# Patient Record
Sex: MALE | Race: WHITE | NOT HISPANIC OR LATINO | Employment: UNEMPLOYED | URBAN - NONMETROPOLITAN AREA
[De-identification: names, ages, dates, MRNs, and addresses within clinical notes are randomized per-mention and may not be internally consistent; named-entity substitution may affect disease eponyms.]

---

## 2023-02-27 LAB
ANION GAP IN SER/PLAS: 11 MMOL/L (ref 10–20)
CALCIUM (MG/DL) IN SER/PLAS: 9.7 MG/DL (ref 8.6–10.3)
CARBON DIOXIDE, TOTAL (MMOL/L) IN SER/PLAS: 25 MMOL/L (ref 21–32)
CHLORIDE (MMOL/L) IN SER/PLAS: 107 MMOL/L (ref 98–107)
CREATININE (MG/DL) IN SER/PLAS: 0.76 MG/DL (ref 0.5–1.3)
GFR MALE: >90 ML/MIN/1.73M2
GLUCOSE (MG/DL) IN SER/PLAS: 102 MG/DL (ref 74–99)
POTASSIUM (MMOL/L) IN SER/PLAS: 4.2 MMOL/L (ref 3.5–5.3)
SODIUM (MMOL/L) IN SER/PLAS: 139 MMOL/L (ref 136–145)
UREA NITROGEN (MG/DL) IN SER/PLAS: 16 MG/DL (ref 6–23)

## 2023-03-02 PROBLEM — R19.02 ABDOMINAL WALL MASS OF LEFT UPPER QUADRANT: Status: ACTIVE | Noted: 2023-03-02

## 2023-03-02 PROBLEM — E66.01 MORBID OBESITY WITH BMI OF 40.0-44.9, ADULT (MULTI): Status: ACTIVE | Noted: 2023-03-02

## 2023-03-06 ENCOUNTER — OFFICE VISIT (OUTPATIENT)
Dept: PRIMARY CARE | Facility: CLINIC | Age: 28
End: 2023-03-06
Payer: COMMERCIAL

## 2023-03-06 VITALS
HEART RATE: 82 BPM | OXYGEN SATURATION: 97 % | WEIGHT: 303 LBS | DIASTOLIC BLOOD PRESSURE: 74 MMHG | HEIGHT: 71 IN | BODY MASS INDEX: 42.42 KG/M2 | SYSTOLIC BLOOD PRESSURE: 128 MMHG

## 2023-03-06 DIAGNOSIS — R10.13 DYSPEPSIA: Primary | ICD-10-CM

## 2023-03-06 PROCEDURE — 99213 OFFICE O/P EST LOW 20 MIN: CPT | Performed by: FAMILY MEDICINE

## 2023-03-06 PROCEDURE — 1036F TOBACCO NON-USER: CPT | Performed by: FAMILY MEDICINE

## 2023-03-06 RX ORDER — PANTOPRAZOLE SODIUM 40 MG/1
40 TABLET, DELAYED RELEASE ORAL DAILY
Qty: 30 TABLET | Refills: 11 | Status: SHIPPED | OUTPATIENT
Start: 2023-03-06 | End: 2024-04-04 | Stop reason: WASHOUT

## 2023-03-06 RX ORDER — PANTOPRAZOLE SODIUM 40 MG/1
1 TABLET, DELAYED RELEASE ORAL DAILY
Qty: 30 TABLET | Refills: 2 | COMMUNITY
Start: 2023-03-02 | End: 2023-03-06 | Stop reason: SDUPTHER

## 2023-03-06 ASSESSMENT — PROMIS GLOBAL HEALTH SCALE
RATE_SOCIAL_SATISFACTION: VERY GOOD
RATE_GENERAL_HEALTH: GOOD
CARRYOUT_SOCIAL_ACTIVITIES: VERY GOOD
RATE_AVERAGE_PAIN: 5
RATE_AVERAGE_FATIGUE: MODERATE
RATE_QUALITY_OF_LIFE: VERY GOOD
CARRYOUT_PHYSICAL_ACTIVITIES: COMPLETELY
RATE_MENTAL_HEALTH: GOOD
RATE_PHYSICAL_HEALTH: FAIR
EMOTIONAL_PROBLEMS: SOMETIMES

## 2023-03-06 NOTE — PROGRESS NOTES
"Subjective   Patient ID: Dennis Kumar is a 27 y.o. male who presents for Follow-up (Review CT scan ).    Previous note reviewed.  CAT scan was ordered but he got worse went to the emergency room and CAT scan was performed.  It was normal.  He was given Protonix.  Today he states he is feeling better.  Reviewed the concepts of gastritis ulcer disease and common postcholecystectomy syndromes.  He does not note these symptoms relate to mealtime he does not have diarrhea         Review of Systems  Denies chest pains palpitations cough shortness of breath heartburn  Objective   /74 (BP Location: Left arm, Patient Position: Sitting)   Pulse 82   Ht 1.803 m (5' 11\")   Wt (!) 137 kg (303 lb)   SpO2 97%   BMI 42.26 kg/m²     Physical Exam  Chest clear heart regular without murmur abdomen is soft nontender no solid or pulsatile masses    Assessment/Plan   Diagnoses and all orders for this visit:  Dyspepsia  Plan at this time is to take pantoprazole for 90 days.  If not better in 30 and 60 days he is to let us know.  After that if symptoms were persisting or had return he would be a candidate for EGD  Recommend 3-month follow-up       "

## 2023-06-12 ENCOUNTER — APPOINTMENT (OUTPATIENT)
Dept: PRIMARY CARE | Facility: CLINIC | Age: 28
End: 2023-06-12
Payer: COMMERCIAL

## 2023-10-16 ENCOUNTER — OFFICE VISIT (OUTPATIENT)
Dept: URGENT CARE | Facility: CLINIC | Age: 28
End: 2023-10-16

## 2023-10-16 VITALS
HEART RATE: 109 BPM | SYSTOLIC BLOOD PRESSURE: 146 MMHG | TEMPERATURE: 98.2 F | DIASTOLIC BLOOD PRESSURE: 91 MMHG | OXYGEN SATURATION: 95 % | RESPIRATION RATE: 14 BRPM

## 2023-10-16 DIAGNOSIS — H61.23 BILATERAL IMPACTED CERUMEN: Primary | ICD-10-CM

## 2023-10-16 PROBLEM — K21.9 GASTROESOPHAGEAL REFLUX DISEASE WITH ULCERATION: Status: ACTIVE | Noted: 2023-10-16

## 2023-10-16 PROCEDURE — 69210 REMOVE IMPACTED EAR WAX UNI: CPT | Performed by: PHYSICIAN ASSISTANT

## 2023-10-16 PROCEDURE — 99212 OFFICE O/P EST SF 10 MIN: CPT | Mod: 25 | Performed by: PHYSICIAN ASSISTANT

## 2023-10-16 NOTE — PATIENT INSTRUCTIONS
"What is ear wax impaction?  Ear wax impaction is when ear wax builds up enough to cause symptoms. Normally, ear wax helps to protect the insides of the ears and prevents injury or infection (figure 1). But having too much ear wax can cause symptoms like trouble hearing and sometimes pain. The medical term for ear wax is \"cerumen.\"    Young children and older adults are more likely than others to have ear wax impaction.    What causes ear wax impaction?  Several different things can cause ear wax impaction:    ?Diseases that affect the ear - Some health problems can affect the shape of the inside of the ear, and make it hard for wax to move out. For example, skin problems that cause skin cells to shed a lot can lead to wax buildup in the ears.    ?Narrow ear canal (figure 2) - In some people, the ear canals are narrower than in others. These people might be more likely to have ear wax impaction. A person's ear canal can become narrower after an ear injury or after severe or multiple ear infections.    ?Changes in ear wax and lining due to aging - As people get older, their ear wax gets harder and thicker. This makes it more difficult for the wax to move out of the ear as it normally should.    ?Ear-cleaning habits - Some people try to clean their ears using cotton swabs (Q-Tips) or other tools. This can actually push the wax deeper into the ear instead of getting it out. Over time, this can cause ear wax impaction.    ?Making too much ear wax - Some people make more ear wax than others. This can happen when water gets trapped in the ear, or when the ear is injured. But some people just have a lot of ear wax for no obvious reason.    ?Using devices that go into the ear - Hearing aids, \"ear bud\"-style headphones, and ear plugs can cause ear wax impaction if they are used over a long period of time.    What are the symptoms of ear wax impaction?  The symptoms include:    ?Trouble hearing    ?Pain in the ear    ?Feeling " "like the ear is blocked or plugged    ?Hearing a ringing noise in the ear    These symptoms can happen in 1 or both ears.    Should I see a doctor or nurse?  Yes. If you or your child have any of these symptoms, see a doctor or nurse. They can check the insides of the ears to figure out if the symptoms are caused by ear wax impaction or another problem, such as an ear infection.    Should I clean my (or my child's) ears at home?  No. The insides of the ears do not usually need to be cleaned. Sticking anything into the ears can push the wax in deeper and cause impaction.    \"Ear candling\" involves lighting 1 end of a hollow candle, and putting the other end in the ear. Ear candling is not recommended for ear wax removal. It does not remove ear wax and can even cause ear injuries and burns.    How is ear wax impaction treated?  There are several treatments to remove impacted ear wax. Doctors and nurses offer these treatments only to people who have bothersome symptoms. They do not recommend treatments for removing ear wax in people who have no symptoms, even if they have ear wax impaction.    In some cases, doctors and nurses will remove ear wax in people whose ears are impacted and who aren't able to let others know if they have symptoms or not. This can include young children, and people who are confused or have trouble communicating, including some older adults.    There are several different ways to remove ear wax:    ?Ear drops - Special ear drops can soften ear wax and help it to drain out. Ear drops are not usually safe for people with an ear infection or damage to the eardrum.    ?Rinsing - In some cases, a doctor or nurse can remove impacted ear wax by squirting water (or another liquid) into the ear to rinse it out.    ?Special tools - A doctor or nurse might use a special tool to remove ear wax. There are different types of tools that can do this safely. These include small sticks, hooks, and spoons. There " are also tools that use suction to pull the wax out.    Can ear wax impaction be prevented?  It depends. If you have repeated problems with ear wax impaction, talk to your doctor or nurse. They might be able to suggest ways to help prevent future impactions. For example, they might suggest using mineral oil to soften the ear wax, removing hearing aids overnight if you use them, or getting your ears cleaned regularly by a doctor or nurse.    What problems should I watch for?  Call for advice if:    ?You have signs of infection - These include fever of 100.4°F (38°C) or higher or chills.    ?Your ear is bleeding or draining pus.    ?You have pain, ringing in the ear, or hearing loss that is new or worse than before.    ?Your symptoms are not getting better after a few days, if you are using ear drop treatments.

## 2023-10-16 NOTE — PROGRESS NOTES
[facility] URGENT CARE   CROW NOTE:      Name: Dennis Kumar, 28 y.o.    CSN:[id number]   MRN:[id number]    PCP: Ranjan Waldron MD    ALL:  No Known Allergies    History:    Chief Complaint: Earache (X 1 day)  Encounter Date: 10/16/2023 3:00 PM    HPI: The history was obtained from the {[facility] HISTORIAN:[id number]}. Dennis is a 28 y.o. male, who presents with a chief complaint of Earache (X 1 day) ***    PMHx:  No past medical history on file.        Current Outpatient Medications   Medication Sig Dispense Refill    pantoprazole (Protonix) 40 mg EC tablet Take 1 tablet (40 mg) by mouth once daily. (Patient not taking: Reported on 10/16/2023) 30 tablet 11     No current facility-administered medications for this visit.         PMSx:  No past surgical history on file.    Fam Hx: No family history on file.    SOC. Hx:     Social History     Socioeconomic History    Marital status:      Spouse name: Not on file    Number of children: Not on file    Years of education: Not on file    Highest education level: Not on file   Occupational History    Not on file   Tobacco Use    Smoking status: Never    Smokeless tobacco: Never   Substance and Sexual Activity    Alcohol use: Not on file    Drug use: Not on file    Sexual activity: Not on file   Other Topics Concern    Not on file   Social History Narrative    Not on file     Social Determinants of Health     Financial Resource Strain: Not on file   Food Insecurity: Not on file   Transportation Needs: Not on file   Physical Activity: Not on file   Stress: Not on file   Social Connections: Not on file   Intimate Partner Violence: Not on file   Housing Stability: Not on file         Vitals:    10/16/23 1417   BP: (!) 146/91   Pulse: 109   Resp: 14   Temp: 36.8 °C (98.2 °F)   SpO2: 95%                Physical Exam      ***    LABORATORY @ RADIOLOGICAL IMAGING (if done):          COURSE/MEDICAL DECISION MAKING:    ***          Clinical Impression:  No  diagnosis found.            Xander Parr PA-C   Advanced Practice Provider  [facility] URGENT CARE

## 2023-10-16 NOTE — PROGRESS NOTES
[facility] URGENT CARE   CROW NOTE:      Name: Dennis Kumar, 28 y.o.    CSN:[id number]   MRN:[id number]    PCP: Ranjan Waldron MD    ALL:  No Known Allergies    History:    Chief Complaint: Earache (X 1 day)  Encounter Date: 10/16/2023 4:45 PM    HPI: The history was obtained from the patient. Dennis is a 28 y.o. male, hx of tympanostomy tube placement, who presents with a chief complaint of Earache (X 1 day). Reports bilateral hearing loss and R ear pain since this morning, which prompted him to seek care. Had tympanostomy tubes placed as a child d/t recurrent ear infections. Had a previous job where he wore ear protection for extended periods of time. Denies tinnitus, vertigo/dizziness, headaches, or N/V.     PMHx:  No past medical history on file.        Current Outpatient Medications   Medication Sig Dispense Refill    pantoprazole (Protonix) 40 mg EC tablet Take 1 tablet (40 mg) by mouth once daily. (Patient not taking: Reported on 10/16/2023) 30 tablet 11     No current facility-administered medications for this visit.         PMSx:  No past surgical history on file.    Fam Hx: No family history on file.    SOC. Hx:     Social History     Socioeconomic History    Marital status:      Spouse name: Not on file    Number of children: Not on file    Years of education: Not on file    Highest education level: Not on file   Occupational History    Not on file   Tobacco Use    Smoking status: Never    Smokeless tobacco: Never   Substance and Sexual Activity    Alcohol use: Not on file    Drug use: Not on file    Sexual activity: Not on file   Other Topics Concern    Not on file   Social History Narrative    Not on file     Social Determinants of Health     Financial Resource Strain: Not on file   Food Insecurity: Not on file   Transportation Needs: Not on file   Physical Activity: Not on file   Stress: Not on file   Social Connections: Not on file   Intimate Partner Violence: Not on  file   Housing Stability: Not on file         Vitals:    10/16/23 1417   BP: (!) 146/91   Pulse: 109   Resp: 14   Temp: 36.8 °C (98.2 °F)   SpO2: 95%            [unfilled]    Physical Exam  Constitutional:       Appearance: Normal appearance.   HENT:      Head: Normocephalic and atraumatic.      Right Ear: External ear normal. Decreased hearing noted. There is impacted cerumen. No mastoid tenderness. Tympanic membrane is scarred.      Left Ear: External ear normal. Decreased hearing noted. There is impacted cerumen. No mastoid tenderness. Tympanic membrane is not scarred.      Ears:      Comments: Prior to procedure, complete impaction of R ear and partial impaction of L ear (TM visible in 11'o clock position.) Following procedure, tympanic membranes visible bilaterally. R ear showed two linear erythematous abrasions (5-6 o clock position), and tympanosclerosis.   Eyes:      Extraocular Movements: Extraocular movements intact.      Conjunctiva/sclera: Conjunctivae normal.      Pupils: Pupils are equal, round, and reactive to light.   Skin:     General: Skin is warm and dry.   Neurological:      General: No focal deficit present.      Mental Status: He is alert.   Psychiatric:         Mood and Affect: Mood normal.     Patient ID: Dennis Kumar is a 28 y.o. male.    Ear Cerumen Removal    Performed by: Julia Lopez  Authorized by: Xander Parr PA-C      Procedure: cerumen removal     Patient had a significant amount of cerumen in his bilateral ear canal(s). R ear was completely impacted, while L ear was partially impacted. Using  irrigation and manual extraction , GIOVANI Ontiveros carefully removed as much cerumen as she could. Following the procedure, there were two linear erythematous abrasions in the 5-6 o clock position, and noted tympanosclerosis. There was no TM perforation, and he tolerated the procedure without difficulty, with complete resolution of symptoms.    LABORATORY @ RADIOLOGICAL IMAGING (if done):      No imaging indicated at this time.      COURSE/MEDICAL DECISION MAKIN y/o M, hx of tympanostomy tube placement, presenting with R ear pain x 1 day with bilateral hearing loss. Cerumen was removed without incident (see procedure note), with complete resolution of pain & hearing loss. Pt made aware that tympanosclerosis is likely secondary to prior surgery. Also made aware of R ear erythema/inflammation, and to follow up if pain worsens/persists. Pt is ok with this plan and has no further concerns.     Clinical Impression:  1. Bilateral impacted cerumen        Xander Parr PA-C   Advanced Practice Provider  [facility] URGENT CARE

## 2024-04-03 ENCOUNTER — APPOINTMENT (OUTPATIENT)
Dept: PRIMARY CARE | Facility: CLINIC | Age: 29
End: 2024-04-03

## 2024-04-04 ENCOUNTER — OFFICE VISIT (OUTPATIENT)
Dept: PRIMARY CARE | Facility: CLINIC | Age: 29
End: 2024-04-04

## 2024-04-04 VITALS
WEIGHT: 315 LBS | SYSTOLIC BLOOD PRESSURE: 130 MMHG | BODY MASS INDEX: 44.1 KG/M2 | DIASTOLIC BLOOD PRESSURE: 86 MMHG | HEIGHT: 71 IN | OXYGEN SATURATION: 97 % | HEART RATE: 89 BPM

## 2024-04-04 DIAGNOSIS — R07.89 ATYPICAL CHEST PAIN: Primary | ICD-10-CM

## 2024-04-04 PROCEDURE — 99213 OFFICE O/P EST LOW 20 MIN: CPT | Performed by: FAMILY MEDICINE

## 2024-04-04 PROCEDURE — 1036F TOBACCO NON-USER: CPT | Performed by: FAMILY MEDICINE

## 2024-04-04 NOTE — PROGRESS NOTES
"Subjective   Patient ID: Dennis Kumar is a 28 y.o. male who presents for Follow-up (ER 3/30/24).    HPI ER reviewed for more than a week he has had some substernal burning sharp chest pain lasting less than 5 minutes but recurring episodically.  He had similar symptoms when he had the flu more recently.  He was advised evaluated in [facility] emergency room as a rule out MI had chest x-ray  Reviewed workup may include a stress test and/or echocardiogram.  Not cardiac could relate to asthma.  He is not known to have had allergic symptoms or asthma as a child.  Possibility of GERD was reviewed and risk factors of caffeine and weight noted.  The skeletal would be the third option of noncardiac causes  Review of Systems  General-no fatigue   ENT no problems with vision swallowing  Cardiac no chest pains palpitations change in exercise tolerance or capacity  Pulmonary no cough shortness of breath  GI no heartburn or abdominal pain  Musculoskeletal no joint pains  Objective   /86   Pulse 89   Ht 1.803 m (5' 11\")   Wt (!) 154 kg (340 lb 3.2 oz)   SpO2 97%   BMI 47.45 kg/m²     Physical Exam  General:  Alert, No acute distress. Appears stated age  Eye:  Pupils are equal, round and reactive to light, Extraocular movements are intact, Normal conjunctiva.    Neck:  Supple, Non-tender, No carotid bruit, No jugular venous distention, No lymphadenopathy, No thyromegaly.    Respiratory:  Lungs are clear to auscultation, Respirations are non-labored, Breath sounds are equal.    Cardiovascular:  Normal rate, Regular rhythm, No murmur.    Gastrointestinal:  Soft, Non-tender, No organomegaly. No solid or pulsatile mass  Integumentary:  Warm, Dry. No concerning lesions on exposed areas  Neurologic:  Alert, Oriented.  Gross and fine motor intact, CN 2-12 intact  Psychiatric:  Cooperative, Appropriate mood & affect.  Jasiel non tender on costochondral jcts  Assessment/Plan   Problem List Items Addressed This Visit  "   None  Visit Diagnoses         Codes    Atypical chest pain    -  Primary R07.89        At this point he is willing to take 1 month of a PPI and see if symptoms montserrat.  He is to report in 1 month and if he is willing able to pursue the above-captioned workup

## 2024-04-09 ENCOUNTER — HOSPITAL ENCOUNTER (OUTPATIENT)
Dept: CARDIOLOGY | Facility: HOSPITAL | Age: 29
Discharge: HOME | End: 2024-04-09

## 2024-04-09 ENCOUNTER — APPOINTMENT (OUTPATIENT)
Dept: RADIOLOGY | Facility: HOSPITAL | Age: 29
End: 2024-04-09

## 2024-04-09 ENCOUNTER — HOSPITAL ENCOUNTER (EMERGENCY)
Facility: HOSPITAL | Age: 29
Discharge: HOME | End: 2024-04-09

## 2024-04-09 VITALS
HEART RATE: 83 BPM | RESPIRATION RATE: 18 BRPM | TEMPERATURE: 98.6 F | OXYGEN SATURATION: 97 % | BODY MASS INDEX: 44.1 KG/M2 | SYSTOLIC BLOOD PRESSURE: 130 MMHG | WEIGHT: 315 LBS | DIASTOLIC BLOOD PRESSURE: 82 MMHG | HEIGHT: 71 IN

## 2024-04-09 DIAGNOSIS — R10.12 LEFT UPPER QUADRANT ABDOMINAL PAIN: Primary | ICD-10-CM

## 2024-04-09 LAB
ALBUMIN SERPL BCP-MCNC: 4.6 G/DL (ref 3.4–5)
ALP SERPL-CCNC: 102 U/L (ref 33–120)
ALT SERPL W P-5'-P-CCNC: 35 U/L (ref 10–52)
ANION GAP SERPL CALC-SCNC: 13 MMOL/L (ref 10–20)
APPEARANCE UR: ABNORMAL
AST SERPL W P-5'-P-CCNC: 18 U/L (ref 9–39)
BASOPHILS # BLD AUTO: 0.04 X10*3/UL (ref 0–0.1)
BASOPHILS NFR BLD AUTO: 0.4 %
BILIRUB SERPL-MCNC: 0.7 MG/DL (ref 0–1.2)
BILIRUB UR STRIP.AUTO-MCNC: NEGATIVE MG/DL
BUN SERPL-MCNC: 14 MG/DL (ref 6–23)
CALCIUM SERPL-MCNC: 9.2 MG/DL (ref 8.6–10.3)
CHLORIDE SERPL-SCNC: 104 MMOL/L (ref 98–107)
CO2 SERPL-SCNC: 25 MMOL/L (ref 21–32)
COLOR UR: YELLOW
CREAT SERPL-MCNC: 0.81 MG/DL (ref 0.5–1.3)
D DIMER PPP FEU-MCNC: 357 NG/ML FEU
EGFRCR SERPLBLD CKD-EPI 2021: >90 ML/MIN/1.73M*2
EOSINOPHIL # BLD AUTO: 0.05 X10*3/UL (ref 0–0.7)
EOSINOPHIL NFR BLD AUTO: 0.4 %
ERYTHROCYTE [DISTWIDTH] IN BLOOD BY AUTOMATED COUNT: 12.9 % (ref 11.5–14.5)
GLUCOSE SERPL-MCNC: 84 MG/DL (ref 74–99)
GLUCOSE UR STRIP.AUTO-MCNC: NEGATIVE MG/DL
HCT VFR BLD AUTO: 46.4 % (ref 41–52)
HGB BLD-MCNC: 15.1 G/DL (ref 13.5–17.5)
IMM GRANULOCYTES # BLD AUTO: 0.03 X10*3/UL (ref 0–0.7)
IMM GRANULOCYTES NFR BLD AUTO: 0.3 % (ref 0–0.9)
KETONES UR STRIP.AUTO-MCNC: NEGATIVE MG/DL
LEUKOCYTE ESTERASE UR QL STRIP.AUTO: NEGATIVE
LIPASE SERPL-CCNC: 39 U/L (ref 9–82)
LYMPHOCYTES # BLD AUTO: 2.28 X10*3/UL (ref 1.2–4.8)
LYMPHOCYTES NFR BLD AUTO: 20.2 %
MCH RBC QN AUTO: 26.1 PG (ref 26–34)
MCHC RBC AUTO-ENTMCNC: 32.5 G/DL (ref 32–36)
MCV RBC AUTO: 80 FL (ref 80–100)
MONOCYTES # BLD AUTO: 0.81 X10*3/UL (ref 0.1–1)
MONOCYTES NFR BLD AUTO: 7.2 %
MUCOUS THREADS #/AREA URNS AUTO: NORMAL /LPF
NEUTROPHILS # BLD AUTO: 8.08 X10*3/UL (ref 1.2–7.7)
NEUTROPHILS NFR BLD AUTO: 71.5 %
NITRITE UR QL STRIP.AUTO: NEGATIVE
NRBC BLD-RTO: 0 /100 WBCS (ref 0–0)
PH UR STRIP.AUTO: 6 [PH]
PLATELET # BLD AUTO: 219 X10*3/UL (ref 150–450)
POTASSIUM SERPL-SCNC: 3.6 MMOL/L (ref 3.5–5.3)
PROT SERPL-MCNC: 7.3 G/DL (ref 6.4–8.2)
PROT UR STRIP.AUTO-MCNC: ABNORMAL MG/DL
RBC # BLD AUTO: 5.79 X10*6/UL (ref 4.5–5.9)
RBC # UR STRIP.AUTO: NEGATIVE /UL
RBC #/AREA URNS AUTO: NORMAL /HPF
SODIUM SERPL-SCNC: 138 MMOL/L (ref 136–145)
SP GR UR STRIP.AUTO: 1.03
SQUAMOUS #/AREA URNS AUTO: NORMAL /HPF
UROBILINOGEN UR STRIP.AUTO-MCNC: 2 MG/DL
WBC # BLD AUTO: 11.3 X10*3/UL (ref 4.4–11.3)
WBC #/AREA URNS AUTO: NORMAL /HPF

## 2024-04-09 PROCEDURE — 83690 ASSAY OF LIPASE: CPT | Performed by: PHYSICIAN ASSISTANT

## 2024-04-09 PROCEDURE — 36415 COLL VENOUS BLD VENIPUNCTURE: CPT | Performed by: PHYSICIAN ASSISTANT

## 2024-04-09 PROCEDURE — 2550000001 HC RX 255 CONTRASTS: Performed by: PHYSICIAN ASSISTANT

## 2024-04-09 PROCEDURE — 85379 FIBRIN DEGRADATION QUANT: CPT | Performed by: PHYSICIAN ASSISTANT

## 2024-04-09 PROCEDURE — 74177 CT ABD & PELVIS W/CONTRAST: CPT

## 2024-04-09 PROCEDURE — 81001 URINALYSIS AUTO W/SCOPE: CPT | Performed by: PHYSICIAN ASSISTANT

## 2024-04-09 PROCEDURE — 93005 ELECTROCARDIOGRAM TRACING: CPT

## 2024-04-09 PROCEDURE — 99285 EMERGENCY DEPT VISIT HI MDM: CPT | Mod: 25

## 2024-04-09 PROCEDURE — 2500000004 HC RX 250 GENERAL PHARMACY W/ HCPCS (ALT 636 FOR OP/ED): Performed by: PHYSICIAN ASSISTANT

## 2024-04-09 PROCEDURE — 74177 CT ABD & PELVIS W/CONTRAST: CPT | Mod: FOREIGN READ | Performed by: RADIOLOGY

## 2024-04-09 PROCEDURE — 85025 COMPLETE CBC W/AUTO DIFF WBC: CPT | Performed by: PHYSICIAN ASSISTANT

## 2024-04-09 PROCEDURE — 80053 COMPREHEN METABOLIC PANEL: CPT | Performed by: PHYSICIAN ASSISTANT

## 2024-04-09 RX ORDER — DICYCLOMINE HYDROCHLORIDE 20 MG/1
20 TABLET ORAL 2 TIMES DAILY
Qty: 10 TABLET | Refills: 0 | Status: SHIPPED | OUTPATIENT
Start: 2024-04-09 | End: 2024-04-14

## 2024-04-09 RX ADMIN — IOHEXOL 68 ML: 350 INJECTION, SOLUTION INTRAVENOUS at 19:23

## 2024-04-09 RX ADMIN — SODIUM CHLORIDE 1000 ML: 9 INJECTION, SOLUTION INTRAVENOUS at 18:27

## 2024-04-09 ASSESSMENT — COLUMBIA-SUICIDE SEVERITY RATING SCALE - C-SSRS
6. HAVE YOU EVER DONE ANYTHING, STARTED TO DO ANYTHING, OR PREPARED TO DO ANYTHING TO END YOUR LIFE?: NO
1. IN THE PAST MONTH, HAVE YOU WISHED YOU WERE DEAD OR WISHED YOU COULD GO TO SLEEP AND NOT WAKE UP?: NO
2. HAVE YOU ACTUALLY HAD ANY THOUGHTS OF KILLING YOURSELF?: NO

## 2024-04-09 ASSESSMENT — ENCOUNTER SYMPTOMS
CHILLS: 0
NAUSEA: 1
HEMATURIA: 0
WOUND: 0
VOMITING: 0
EYE PAIN: 0
SEIZURES: 0
WHEEZING: 0
FEVER: 0
COUGH: 0
DIARRHEA: 1
SHORTNESS OF BREATH: 0
BACK PAIN: 0
SORE THROAT: 0
ARTHRALGIAS: 0
ABDOMINAL PAIN: 1
DYSURIA: 0
COLOR CHANGE: 0
PALPITATIONS: 0

## 2024-04-09 ASSESSMENT — PAIN SCALES - GENERAL: PAINLEVEL_OUTOF10: 6

## 2024-04-09 ASSESSMENT — PAIN - FUNCTIONAL ASSESSMENT: PAIN_FUNCTIONAL_ASSESSMENT: 0-10

## 2024-04-09 ASSESSMENT — PAIN DESCRIPTION - LOCATION: LOCATION: ABDOMEN

## 2024-04-09 NOTE — ED PROVIDER NOTES
Patient is a 28-year-old male who presents to the emergency room with a chief complaint of left upper quadrant abdominal pain that radiates into his chest and mid back.  He states that symptom onset was approximately 2 weeks ago.  He reports nausea with no vomiting.  No diarrhea.  He states that at times the pain is worse when he takes a deep breath.  He denies any shortness of breath.  He denies any injury.  He states that he was evaluated recently at [facility] and states that his workup was negative.  He states that he actually has had this pain intermittently over the past 2 years.  He states that the cause has never been determined. He states that the pain today is worse than it has been.           Review of Systems   Constitutional:  Negative for chills and fever.   HENT:  Negative for ear pain and sore throat.    Eyes:  Negative for pain and visual disturbance.   Respiratory:  Negative for cough, shortness of breath and wheezing.    Cardiovascular:  Negative for chest pain and palpitations.   Gastrointestinal:  Positive for abdominal pain, diarrhea and nausea. Negative for vomiting.   Genitourinary:  Negative for dysuria and hematuria.   Musculoskeletal:  Negative for arthralgias and back pain.   Skin:  Negative for color change, rash and wound.   Neurological:  Negative for seizures and syncope.   All other systems reviewed and are negative.       Physical Exam  Vitals and nursing note reviewed.   Constitutional:       General: He is not in acute distress.     Appearance: He is well-developed.   HENT:      Head: Normocephalic and atraumatic.   Eyes:      Extraocular Movements: Extraocular movements intact.      Conjunctiva/sclera: Conjunctivae normal.      Pupils: Pupils are equal, round, and reactive to light.   Cardiovascular:      Rate and Rhythm: Normal rate and regular rhythm.      Heart sounds: No murmur heard.  Pulmonary:      Effort: Pulmonary effort is normal. No respiratory distress.      Breath  sounds: Normal breath sounds.   Abdominal:      Palpations: Abdomen is soft. There is no shifting dullness, fluid wave, hepatomegaly or splenomegaly.      Tenderness: There is abdominal tenderness in the left upper quadrant.   Musculoskeletal:         General: No swelling.      Cervical back: Neck supple.   Skin:     General: Skin is warm and dry.      Capillary Refill: Capillary refill takes less than 2 seconds.   Neurological:      General: No focal deficit present.      Mental Status: He is alert.   Psychiatric:         Mood and Affect: Mood normal.          Labs Reviewed   CBC WITH AUTO DIFFERENTIAL - Abnormal       Result Value    WBC 11.3      nRBC 0.0      RBC 5.79      Hemoglobin 15.1      Hematocrit 46.4      MCV 80      MCH 26.1      MCHC 32.5      RDW 12.9      Platelets 219      Neutrophils % 71.5      Immature Granulocytes %, Automated 0.3      Lymphocytes % 20.2      Monocytes % 7.2      Eosinophils % 0.4      Basophils % 0.4      Neutrophils Absolute 8.08 (*)     Immature Granulocytes Absolute, Automated 0.03      Lymphocytes Absolute 2.28      Monocytes Absolute 0.81      Eosinophils Absolute 0.05      Basophils Absolute 0.04     URINALYSIS WITH REFLEX CULTURE AND MICROSCOPIC - Abnormal    Color, Urine Yellow      Appearance, Urine Hazy (*)     Specific Gravity, Urine 1.029      pH, Urine 6.0      Protein, Urine 30 (1+) (*)     Glucose, Urine NEGATIVE      Blood, Urine NEGATIVE      Ketones, Urine NEGATIVE      Bilirubin, Urine NEGATIVE      Urobilinogen, Urine 2.0 (*)     Nitrite, Urine NEGATIVE      Leukocyte Esterase, Urine NEGATIVE     COMPREHENSIVE METABOLIC PANEL   LIPASE   URINALYSIS WITH REFLEX CULTURE AND MICROSCOPIC    Narrative:     The following orders were created for panel order Urinalysis with Reflex Culture and Microscopic.  Procedure                               Abnormality         Status                     ---------                               -----------         ------                      Urinalysis with Reflex C...[[id number]]  Abnormal            Final result               Extra Urine Gray Tube[[id number]]                            In process                   Please view results for these tests on the individual orders.   EXTRA URINE GRAY TUBE   D-DIMER, VTE EXCLUSION   URINALYSIS MICROSCOPIC WITH REFLEX CULTURE    WBC, Urine 1-5      RBC, Urine 1-2      Squamous Epithelial Cells, Urine 1-9 (SPARSE)      Mucus, Urine 1+          No orders to display        ECG 12 lead    Performed by: Jenny Stovall PA-C  Authorized by: Jenny Stovall PA-C    ECG interpreted by ED Physician in the absence of a cardiologist: yes    Comments:      EKG shows normal sinus rhythm with a rate of 87 bpm.  No ST elevation.  MI interval is 114 ms and QRS duration is 90 ms.  EKG interpretation per myself, Jenny Stovall       Medical Decision Making  Patient is a 28-year-old male who presents to the emergency room department with a chief complaint of left upper quadrant pain over the past 2 weeks, he states that at times it radiates to his back and into his chest.  He states that occasionally when he takes a deep breath he does have some pain.  He was seen a week and a half ago at an outside emergency room in which she had a chest x-ray and blood work performed which were unremarkable.  Today patient is denying any chest pain.  He reports that his pain is in his left upper quadrant.  EKG unremarkable.  D-dimer is within normal limits.  CT scan of the abdomen pelvis performed which shows no acute abnormality.  Patient reports that he has had this pain intermittently for the past 2 years, recommended PCP follow-up and likely referral to gastroenterology.  Patient prescribed Bentyl.  Instructed to return for any new or worsening symptoms.  Differential diagnosis includes but not limited to pancreatitis, cholecystitis, choledocholithiasis, colitis, pulmonary embolism, acute coronary syndrome    Amount  and/or Complexity of Data Reviewed  Labs: ordered. Decision-making details documented in ED Course.  Radiology: ordered. Decision-making details documented in ED Course.    Risk  Prescription drug management.         Diagnoses as of 04/09/24 2147   Left upper quadrant abdominal pain                    Jenny Stovall PA-C  04/09/24 2141

## 2024-04-10 LAB — HOLD SPECIMEN: NORMAL

## 2024-04-11 LAB
ATRIAL RATE: 87 BPM
P AXIS: 37 DEGREES
P OFFSET: 196 MS
P ONSET: 154 MS
PR INTERVAL: 114 MS
Q ONSET: 211 MS
QRS COUNT: 14 BEATS
QRS DURATION: 90 MS
QT INTERVAL: 348 MS
QTC CALCULATION(BAZETT): 418 MS
QTC FREDERICIA: 393 MS
R AXIS: 34 DEGREES
T AXIS: 29 DEGREES
T OFFSET: 385 MS
VENTRICULAR RATE: 87 BPM

## 2024-05-02 ENCOUNTER — HOSPITAL ENCOUNTER (EMERGENCY)
Facility: HOSPITAL | Age: 29
Discharge: HOME | End: 2024-05-02
Attending: EMERGENCY MEDICINE

## 2024-05-02 ENCOUNTER — APPOINTMENT (OUTPATIENT)
Dept: PRIMARY CARE | Facility: CLINIC | Age: 29
End: 2024-05-02

## 2024-05-02 ENCOUNTER — APPOINTMENT (OUTPATIENT)
Dept: RADIOLOGY | Facility: HOSPITAL | Age: 29
End: 2024-05-02

## 2024-05-02 VITALS
HEART RATE: 93 BPM | TEMPERATURE: 98.3 F | SYSTOLIC BLOOD PRESSURE: 120 MMHG | OXYGEN SATURATION: 95 % | WEIGHT: 310 LBS | DIASTOLIC BLOOD PRESSURE: 66 MMHG | BODY MASS INDEX: 44.38 KG/M2 | HEIGHT: 70 IN | RESPIRATION RATE: 16 BRPM

## 2024-05-02 DIAGNOSIS — R10.13 EPIGASTRIC PAIN: Primary | ICD-10-CM

## 2024-05-02 DIAGNOSIS — M54.6 ACUTE BILATERAL THORACIC BACK PAIN: ICD-10-CM

## 2024-05-02 LAB
ALBUMIN SERPL BCP-MCNC: 4.3 G/DL (ref 3.4–5)
ALP SERPL-CCNC: 91 U/L (ref 33–120)
ALT SERPL W P-5'-P-CCNC: 32 U/L (ref 10–52)
ANION GAP SERPL CALC-SCNC: 12 MMOL/L (ref 10–20)
APPEARANCE UR: CLEAR
AST SERPL W P-5'-P-CCNC: 18 U/L (ref 9–39)
BASOPHILS # BLD AUTO: 0.03 X10*3/UL (ref 0–0.1)
BASOPHILS NFR BLD AUTO: 0.3 %
BILIRUB SERPL-MCNC: 0.6 MG/DL (ref 0–1.2)
BILIRUB UR STRIP.AUTO-MCNC: NEGATIVE MG/DL
BUN SERPL-MCNC: 11 MG/DL (ref 6–23)
CALCIUM SERPL-MCNC: 9.4 MG/DL (ref 8.6–10.3)
CHLORIDE SERPL-SCNC: 104 MMOL/L (ref 98–107)
CO2 SERPL-SCNC: 23 MMOL/L (ref 21–32)
COLOR UR: YELLOW
CREAT SERPL-MCNC: 0.7 MG/DL (ref 0.5–1.3)
EGFRCR SERPLBLD CKD-EPI 2021: >90 ML/MIN/1.73M*2
EOSINOPHIL # BLD AUTO: 0.07 X10*3/UL (ref 0–0.7)
EOSINOPHIL NFR BLD AUTO: 0.7 %
ERYTHROCYTE [DISTWIDTH] IN BLOOD BY AUTOMATED COUNT: 13.3 % (ref 11.5–14.5)
GLUCOSE SERPL-MCNC: 144 MG/DL (ref 74–99)
GLUCOSE UR STRIP.AUTO-MCNC: NEGATIVE MG/DL
HCT VFR BLD AUTO: 45.6 % (ref 41–52)
HGB BLD-MCNC: 15 G/DL (ref 13.5–17.5)
IMM GRANULOCYTES # BLD AUTO: 0.02 X10*3/UL (ref 0–0.7)
IMM GRANULOCYTES NFR BLD AUTO: 0.2 % (ref 0–0.9)
KETONES UR STRIP.AUTO-MCNC: NEGATIVE MG/DL
LACTATE SERPL-SCNC: 1.3 MMOL/L (ref 0.4–2)
LEUKOCYTE ESTERASE UR QL STRIP.AUTO: NEGATIVE
LIPASE SERPL-CCNC: 67 U/L (ref 9–82)
LYMPHOCYTES # BLD AUTO: 1.69 X10*3/UL (ref 1.2–4.8)
LYMPHOCYTES NFR BLD AUTO: 16.8 %
MCH RBC QN AUTO: 26.7 PG (ref 26–34)
MCHC RBC AUTO-ENTMCNC: 32.9 G/DL (ref 32–36)
MCV RBC AUTO: 81 FL (ref 80–100)
MONOCYTES # BLD AUTO: 0.73 X10*3/UL (ref 0.1–1)
MONOCYTES NFR BLD AUTO: 7.3 %
NEUTROPHILS # BLD AUTO: 7.52 X10*3/UL (ref 1.2–7.7)
NEUTROPHILS NFR BLD AUTO: 74.7 %
NITRITE UR QL STRIP.AUTO: NEGATIVE
NRBC BLD-RTO: 0 /100 WBCS (ref 0–0)
PH UR STRIP.AUTO: 7 [PH]
PLATELET # BLD AUTO: 194 X10*3/UL (ref 150–450)
POTASSIUM SERPL-SCNC: 3.9 MMOL/L (ref 3.5–5.3)
PROT SERPL-MCNC: 7 G/DL (ref 6.4–8.2)
PROT UR STRIP.AUTO-MCNC: NEGATIVE MG/DL
RBC # BLD AUTO: 5.61 X10*6/UL (ref 4.5–5.9)
RBC # UR STRIP.AUTO: NEGATIVE /UL
SODIUM SERPL-SCNC: 135 MMOL/L (ref 136–145)
SP GR UR STRIP.AUTO: 1.01
UROBILINOGEN UR STRIP.AUTO-MCNC: <2 MG/DL
WBC # BLD AUTO: 10.1 X10*3/UL (ref 4.4–11.3)

## 2024-05-02 PROCEDURE — 85025 COMPLETE CBC W/AUTO DIFF WBC: CPT | Performed by: EMERGENCY MEDICINE

## 2024-05-02 PROCEDURE — 83605 ASSAY OF LACTIC ACID: CPT | Performed by: EMERGENCY MEDICINE

## 2024-05-02 PROCEDURE — 74177 CT ABD & PELVIS W/CONTRAST: CPT | Performed by: RADIOLOGY

## 2024-05-02 PROCEDURE — 72128 CT CHEST SPINE W/O DYE: CPT | Performed by: RADIOLOGY

## 2024-05-02 PROCEDURE — 99285 EMERGENCY DEPT VISIT HI MDM: CPT | Mod: 25

## 2024-05-02 PROCEDURE — 80053 COMPREHEN METABOLIC PANEL: CPT | Performed by: EMERGENCY MEDICINE

## 2024-05-02 PROCEDURE — 72128 CT CHEST SPINE W/O DYE: CPT

## 2024-05-02 PROCEDURE — 2500000004 HC RX 250 GENERAL PHARMACY W/ HCPCS (ALT 636 FOR OP/ED): Performed by: EMERGENCY MEDICINE

## 2024-05-02 PROCEDURE — 81003 URINALYSIS AUTO W/O SCOPE: CPT | Performed by: EMERGENCY MEDICINE

## 2024-05-02 PROCEDURE — 96360 HYDRATION IV INFUSION INIT: CPT

## 2024-05-02 PROCEDURE — 36415 COLL VENOUS BLD VENIPUNCTURE: CPT | Performed by: EMERGENCY MEDICINE

## 2024-05-02 PROCEDURE — 2550000001 HC RX 255 CONTRASTS: Performed by: EMERGENCY MEDICINE

## 2024-05-02 PROCEDURE — 74177 CT ABD & PELVIS W/CONTRAST: CPT

## 2024-05-02 PROCEDURE — 96361 HYDRATE IV INFUSION ADD-ON: CPT

## 2024-05-02 PROCEDURE — 83690 ASSAY OF LIPASE: CPT | Performed by: EMERGENCY MEDICINE

## 2024-05-02 RX ORDER — SODIUM CHLORIDE 9 MG/ML
150 INJECTION, SOLUTION INTRAVENOUS CONTINUOUS
Status: DISCONTINUED | OUTPATIENT
Start: 2024-05-02 | End: 2024-05-02 | Stop reason: HOSPADM

## 2024-05-02 RX ORDER — ONDANSETRON HYDROCHLORIDE 2 MG/ML
4 INJECTION, SOLUTION INTRAVENOUS ONCE
Status: DISCONTINUED | OUTPATIENT
Start: 2024-05-02 | End: 2024-05-02 | Stop reason: HOSPADM

## 2024-05-02 RX ORDER — ONDANSETRON HYDROCHLORIDE 4 MG/2ML
4 INJECTION, SOLUTION INTRAMUSCULAR; INTRAVENOUS ONCE
Status: DISCONTINUED | OUTPATIENT
Start: 2024-05-02 | End: 2024-05-02

## 2024-05-02 RX ORDER — MORPHINE SULFATE 4 MG/ML
4 INJECTION, SOLUTION INTRAMUSCULAR; INTRAVENOUS ONCE
Status: DISCONTINUED | OUTPATIENT
Start: 2024-05-02 | End: 2024-05-02 | Stop reason: HOSPADM

## 2024-05-02 RX ADMIN — SODIUM CHLORIDE 150 ML/HR: 9 INJECTION, SOLUTION INTRAVENOUS at 15:22

## 2024-05-02 RX ADMIN — IOHEXOL 68 ML: 350 INJECTION, SOLUTION INTRAVENOUS at 15:50

## 2024-05-02 ASSESSMENT — PAIN DESCRIPTION - ONSET: ONSET: PROGRESSIVE

## 2024-05-02 ASSESSMENT — COLUMBIA-SUICIDE SEVERITY RATING SCALE - C-SSRS
1. IN THE PAST MONTH, HAVE YOU WISHED YOU WERE DEAD OR WISHED YOU COULD GO TO SLEEP AND NOT WAKE UP?: NO
2. HAVE YOU ACTUALLY HAD ANY THOUGHTS OF KILLING YOURSELF?: NO
6. HAVE YOU EVER DONE ANYTHING, STARTED TO DO ANYTHING, OR PREPARED TO DO ANYTHING TO END YOUR LIFE?: NO

## 2024-05-02 ASSESSMENT — PAIN DESCRIPTION - FREQUENCY: FREQUENCY: CONSTANT/CONTINUOUS

## 2024-05-02 ASSESSMENT — PAIN DESCRIPTION - PAIN TYPE: TYPE: ACUTE PAIN

## 2024-05-02 ASSESSMENT — PAIN SCALES - GENERAL
PAINLEVEL_OUTOF10: 7
PAINLEVEL_OUTOF10: 6

## 2024-05-02 ASSESSMENT — PAIN - FUNCTIONAL ASSESSMENT: PAIN_FUNCTIONAL_ASSESSMENT: 0-10

## 2024-05-02 ASSESSMENT — PAIN DESCRIPTION - DESCRIPTORS
DESCRIPTORS: THROBBING
DESCRIPTORS_2: STABBING
DESCRIPTORS: STABBING

## 2024-05-02 ASSESSMENT — PAIN DESCRIPTION - ORIENTATION
ORIENTATION: MID;UPPER
ORIENTATION_2: UPPER

## 2024-05-02 ASSESSMENT — PAIN DESCRIPTION - LOCATION
LOCATION_2: ABDOMEN
LOCATION: BACK

## 2024-05-02 NOTE — ED PROVIDER NOTES
Midthoracic back pain, upper abdominal pain.  This 29-year-old white male presents to the ED with complaint of upper back symptoms that began 2 weeks ago and denies any known injury.  He states that after that he then developed diffuse bilateral upper quadrant abdominal pain.  He states that he has had nausea without vomiting and also has had some diarrhea today developed headache symptoms.  He describes his abdominal pain symptoms as sharp and stabbing he also admits to some urinary frequency though denies any dysuria or hematuria previous history of kidney stones.  Patient has had his gallbladder removed previously.  Patient states that nothing makes his symptoms better but movement makes his symptoms worse.            History provided by:  Patient   used: No         Physical Exam  Vitals and nursing note reviewed.   Constitutional:       General: He is awake.      Appearance: Normal appearance. He is morbidly obese.   HENT:      Head: Normocephalic and atraumatic.      Right Ear: Hearing and external ear normal.      Left Ear: Hearing and external ear normal.      Nose: Nose normal. No congestion or rhinorrhea.      Mouth/Throat:      Lips: Pink.      Mouth: Mucous membranes are moist.      Pharynx: Oropharynx is clear. Uvula midline. No oropharyngeal exudate or posterior oropharyngeal erythema.   Eyes:      General: Lids are normal. Vision grossly intact.         Right eye: No discharge.         Left eye: No discharge.      Extraocular Movements: Extraocular movements intact.      Conjunctiva/sclera: Conjunctivae normal.      Pupils: Pupils are equal, round, and reactive to light.   Cardiovascular:      Rate and Rhythm: Regular rhythm. Tachycardia present.      Pulses: Normal pulses.      Heart sounds: Normal heart sounds. No murmur heard.     No friction rub. No gallop.   Pulmonary:      Effort: Pulmonary effort is normal. No respiratory distress.      Breath sounds: Normal breath sounds. No  stridor. No wheezing, rhonchi or rales.   Chest:      Chest wall: No tenderness.   Abdominal:      General: Abdomen is flat. Bowel sounds are normal. There is no distension.      Palpations: Abdomen is soft. There is no mass.      Tenderness: There is abdominal tenderness in the right upper quadrant, epigastric area and left upper quadrant. There is no guarding or rebound.      Hernia: No hernia is present.       Musculoskeletal:         General: No swelling, deformity or signs of injury. Normal range of motion.      Cervical back: Normal, full passive range of motion without pain, normal range of motion and neck supple.      Thoracic back: Tenderness present.      Lumbar back: Normal.        Back:       Right lower leg: No edema.      Left lower leg: No edema.   Skin:     General: Skin is warm and dry.      Capillary Refill: Capillary refill takes less than 2 seconds.      Coloration: Skin is not jaundiced or pale.      Findings: No bruising, erythema, lesion or rash.   Neurological:      General: No focal deficit present.      Mental Status: He is alert and oriented to person, place, and time.      GCS: GCS eye subscore is 4. GCS verbal subscore is 5. GCS motor subscore is 6.      Cranial Nerves: Cranial nerves 2-12 are intact. No cranial nerve deficit.      Sensory: Sensation is intact. No sensory deficit.      Motor: Motor function is intact. No weakness.      Coordination: Coordination is intact. Coordination normal.      Deep Tendon Reflexes: Reflexes normal.   Psychiatric:         Attention and Perception: Attention and perception normal.         Mood and Affect: Mood and affect normal.         Speech: Speech normal.         Behavior: Behavior normal. Behavior is cooperative.         Thought Content: Thought content normal.         Cognition and Memory: Cognition and memory normal.         Judgment: Judgment normal.          Labs Reviewed   COMPREHENSIVE METABOLIC PANEL - Abnormal       Result Value     Glucose 144 (*)     Sodium 135 (*)     Potassium 3.9      Chloride 104      Bicarbonate 23      Anion Gap 12      Urea Nitrogen 11      Creatinine 0.70      eGFR >90      Calcium 9.4      Albumin 4.3      Alkaline Phosphatase 91      Total Protein 7.0      AST 18      Bilirubin, Total 0.6      ALT 32     LACTATE - Normal    Lactate 1.3      Narrative:     Venipuncture immediately after or during the administration of Metamizole may lead to falsely low results. Testing should be performed immediately  prior to Metamizole dosing.   LIPASE - Normal    Lipase 67      Narrative:     Venipuncture immediately after or during the administration of Metamizole may lead to falsely low results. Testing should be performed immediately prior to Metamizole dosing.   URINALYSIS WITH REFLEX CULTURE AND MICROSCOPIC - Normal    Color, Urine Yellow      Appearance, Urine Clear      Specific Gravity, Urine 1.014      pH, Urine 7.0      Protein, Urine NEGATIVE      Glucose, Urine NEGATIVE      Blood, Urine NEGATIVE      Ketones, Urine NEGATIVE      Bilirubin, Urine NEGATIVE      Urobilinogen, Urine <2.0      Nitrite, Urine NEGATIVE      Leukocyte Esterase, Urine NEGATIVE     CBC WITH AUTO DIFFERENTIAL    WBC 10.1      nRBC 0.0      RBC 5.61      Hemoglobin 15.0      Hematocrit 45.6      MCV 81      MCH 26.7      MCHC 32.9      RDW 13.3      Platelets 194      Neutrophils % 74.7      Immature Granulocytes %, Automated 0.2      Lymphocytes % 16.8      Monocytes % 7.3      Eosinophils % 0.7      Basophils % 0.3      Neutrophils Absolute 7.52      Immature Granulocytes Absolute, Automated 0.02      Lymphocytes Absolute 1.69      Monocytes Absolute 0.73      Eosinophils Absolute 0.07      Basophils Absolute 0.03     URINALYSIS WITH REFLEX CULTURE AND MICROSCOPIC    Narrative:     The following orders were created for panel order Urinalysis with Reflex Culture and Microscopic.  Procedure                               Abnormality         Status                      ---------                               -----------         ------                     Urinalysis with Reflex C...[[id number]]  Normal              Final result               Extra Urine Gray Tube[[id number]]                            Final result                 Please view results for these tests on the individual orders.   EXTRA URINE GRAY TUBE    Extra Tube Hold for add-ons.          CT abdomen pelvis w IV contrast   Final Result   Mild diffuse fatty infiltration of the liver is suspected. The   remainder of the exam is unremarkable. Status post cholecystectomy.        MACRO:   none        Signed by: Dom Martell 5/2/2024 4:17 PM   Dictation workstation:   [id number]      CT thoracic spine wo IV contrast   Final Result   No evidence for a fracture on this exam.   Mild scarring mid to lower thoracic spine.   Schmorl's nodes of T8 and T9.        MACRO:   none        Signed by: Dom Martell 5/2/2024 4:12 PM   Dictation workstation:   [id number]           Procedures     Medical Decision Making  Patient was seen and evaluated due to complaints of midthoracic back pain and upper abdominal pain symptoms.  Patient had lab work, urinalysis and CT scan of the abdomen pelvis and a CT scan of the thoracic spine performed without contrast.  Lab work and urinalysis was unremarkable.  CT scan imaging of the abdomen pelvis with IV contrast reveals evidence of a fatty liver with evidence of previous cholecystectomy.  CT scan imaging of the thoracic spine revealed no acute abnormality.  I then had a detailed discussion with the patient concerning his lab and CT scan results prior to discharging him home he was referred back to his primary care doctor for follow-up.         Diagnoses as of 05/04/24 0721   Epigastric pain   Acute bilateral thoracic back pain                    Esequiel Levy, DO  05/02/24 1653       Esequiel Levy, DO  05/04/24 0721

## 2024-05-03 LAB — HOLD SPECIMEN: NORMAL

## 2024-05-04 ASSESSMENT — VISUAL ACUITY: OU: 1

## 2024-05-06 ASSESSMENT — ENCOUNTER SYMPTOMS
ABDOMINAL PAIN: 1
PERIANAL NUMBNESS: 0
WEIGHT LOSS: 1
NUMBNESS: 0
WEAKNESS: 1
TINGLING: 0
PARESIS: 0
BACK PAIN: 1
PARESTHESIAS: 0
HEADACHES: 0
DYSURIA: 0
FEVER: 0
LEG PAIN: 0
BOWEL INCONTINENCE: 0

## 2024-05-07 ENCOUNTER — OFFICE VISIT (OUTPATIENT)
Dept: PRIMARY CARE | Facility: CLINIC | Age: 29
End: 2024-05-07

## 2024-05-07 VITALS
BODY MASS INDEX: 45.1 KG/M2 | OXYGEN SATURATION: 98 % | HEART RATE: 87 BPM | HEIGHT: 70 IN | DIASTOLIC BLOOD PRESSURE: 80 MMHG | SYSTOLIC BLOOD PRESSURE: 132 MMHG | WEIGHT: 315 LBS

## 2024-05-07 DIAGNOSIS — M54.6 THORACIC BACK PAIN, UNSPECIFIED BACK PAIN LATERALITY, UNSPECIFIED CHRONICITY: Primary | ICD-10-CM

## 2024-05-07 PROCEDURE — 99212 OFFICE O/P EST SF 10 MIN: CPT | Performed by: FAMILY MEDICINE

## 2024-05-07 PROCEDURE — 1036F TOBACCO NON-USER: CPT | Performed by: FAMILY MEDICINE

## 2024-05-07 ASSESSMENT — ENCOUNTER SYMPTOMS
LEG PAIN: 0
FEVER: 0
DYSURIA: 0
HEADACHES: 0
ABDOMINAL PAIN: 1
NUMBNESS: 0
BOWEL INCONTINENCE: 0
PARESTHESIAS: 0
WEAKNESS: 1
PERIANAL NUMBNESS: 0
BACK PAIN: 1
PARESIS: 0
TINGLING: 0
WEIGHT LOSS: 1

## 2024-05-07 NOTE — PROGRESS NOTES
"Subjective   Patient ID: Dennis Kumar is a 29 y.o. male who presents for Follow-up (1 mo).    Back Pain  This is a new problem. The current episode started more than 1 month ago. The problem occurs constantly. The problem has been waxing and waning since onset. The pain is present in the thoracic spine. The quality of the pain is described as aching. The pain does not radiate. The pain is at a severity of 6/10. The pain is The same all the time. The symptoms are aggravated by sitting and stress. Stiffness is present All day. Associated symptoms include abdominal pain, chest pain, weakness and weight loss. Pertinent negatives include no bladder incontinence, bowel incontinence, dysuria, fever, headaches, leg pain, numbness, paresis, paresthesias, pelvic pain, perianal numbness or tingling. Risk factors include lack of exercise, obesity, poor posture and sedentary lifestyle.      On ppi for 3 wks and atypical cp from 4-9 ov is better, can have some sx when stressed.  In  ER x2 for same different complaint.  Shoulders to middle back reviewed films with patient showing arthritic change encouraged moderate activity and acetaminophen.  Given his atypical chest pain went away with PPI would avoid nonsteroidals at this time.  Review of Systems   Constitutional:  Positive for weight loss. Negative for fever.   Cardiovascular:  Positive for chest pain.   Gastrointestinal:  Positive for abdominal pain. Negative for bowel incontinence.   Genitourinary:  Negative for bladder incontinence, dysuria and pelvic pain.   Musculoskeletal:  Positive for back pain.   Neurological:  Positive for weakness. Negative for tingling, numbness, headaches and paresthesias.       Objective   /80   Pulse 87   Ht 1.778 m (5' 10\")   Wt (!) 152 kg (336 lb 3.2 oz)   SpO2 98%   BMI 48.24 kg/m²     Physical Exam  Chest is clear heart is regular nontender palpation on the spinous processes but he is tender over the rhomboid he Alles and upper " trapezius  Assessment/Plan   Problem List Items Addressed This Visit    None  Visit Diagnoses         Codes    Thoracic back pain, unspecified back pain laterality, unspecified chronicity    -  Primary M54.6          As symptoms are improving would continue to monitor to call if not resolved over the next couple weeks

## 2024-05-08 ENCOUNTER — APPOINTMENT (OUTPATIENT)
Dept: PRIMARY CARE | Facility: CLINIC | Age: 29
End: 2024-05-08